# Patient Record
Sex: FEMALE | Race: WHITE | NOT HISPANIC OR LATINO | Employment: OTHER | URBAN - METROPOLITAN AREA
[De-identification: names, ages, dates, MRNs, and addresses within clinical notes are randomized per-mention and may not be internally consistent; named-entity substitution may affect disease eponyms.]

---

## 2018-10-24 ENCOUNTER — OFFICE VISIT (OUTPATIENT)
Dept: PSYCHIATRY | Facility: CLINIC | Age: 68
End: 2018-10-24
Payer: MEDICARE

## 2018-10-24 DIAGNOSIS — F41.1 GAD (GENERALIZED ANXIETY DISORDER): Primary | ICD-10-CM

## 2018-10-24 DIAGNOSIS — F33.1: ICD-10-CM

## 2018-10-24 PROCEDURE — 90791 PSYCH DIAGNOSTIC EVALUATION: CPT | Mod: S$PBB,,, | Performed by: SOCIAL WORKER

## 2018-10-24 PROCEDURE — 90791 PSYCH DIAGNOSTIC EVALUATION: CPT | Mod: PBBFAC | Performed by: SOCIAL WORKER

## 2018-10-24 NOTE — PROGRESS NOTES
"Psychiatry Initial Visit (PhD/LCSW)  Diagnostic Interview - CPT 10928    Date: 10/24/2018    Site: [facility]    Referral source: self    Clinical status of patient: Outpatient    Zoe Perdomo, a 67 y.o. female, for initial evaluation visit.  Met with patient.    Chief complaint/reason for encounter: depression and anxiety    History of present illness: Pt is 30 minutes late due to transportation from [facility] not getting her here on time.  She also thought she was seeing a psychiatrist and was disappointed that she was seeing me.  She has no teeth, stating her dentures don't fit so she doesn't wear them.  She was very difficult to understand as she had a tendency to talk rapidly and her articulation was very poor.  She has been getting medical care at [facility] and saw a psychiatrist there who she was not happy with.  It was not clear as to why.  She states she has always had bad anxiety and has been a worrier.  She mentions losing her mother in 2010 and a brother last year that has been upsetting to her.  Also her only son has  his wife who will not let them see her grandson any longer.  Pt states she cries a lot and is "miserable".  She does very little, states that she can't stay interested in TV.  She mentions being behind in her [facility] fee and fears being thrown out.  She says she worries about things that she shouldn't worry about.  She talks about having trouble eating due to her anxiety and says she currently weighs 79#.  She also states she has bad eating habits and trouble with her thyroid.  It is difficult to get a more detailed story from her on these things due to difficulty in understanding her speech.    Pain: 5    Symptoms:   · Mood: depressed mood, diminished interest, weight loss, tearfulness and social isolation  · Anxiety: excessive anxiety/worry and restlessness/keyed up  · Substance abuse: denied  · Cognitive functioning: denied  · Health behaviors: noncontributory    Psychiatric " history: has seen a psychiatrist in the past    Medical history: Pt has diabetes, she states she has pins and a plate in one hip and thigh due to breaking it a while back, also, had a broken wrist    Family history of psychiatric illness: not known    Social history (marriage, employment, etc.): Pt lives with her daughter.  She is .  I was unable to find out any other information.    Substance use:   Alcohol: unknown   Drugs: none   Tobacco: none   Caffeine: none    Current medications and drug reactions (include OTC, herbal): see medication list :  Pt is on 30 mg. Remeron, Buspar, and 300 mg Seroquel    Strengths and liabilities: Liability: Patient is dependent.    Current Evaluation:     Mental Status Exam:  General Appearance:  thin & gaunt looking   Speech: unable to understand due to no teeth      Level of Cooperation: cooperative      Thought Processes: goal-directed   Mood: anxious      Thought Content: normal, no suicidality, no homicidality, delusions, or paranoia   Affect: appropriate   Orientation: Oriented x3   Memory: unable to assess   Attention Span & Concentration: intact   Fund of General Knowledge: intact and appropriate to age and level of education   Abstract Reasoning: did not assess   Judgment & Insight: poor     Language  unable to understand her speech     Diagnostic Impression - Plan:       ICD-10-CM ICD-9-CM   1. ROMAN (generalized anxiety disorder) F41.1 300.02   2. 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